# Patient Record
Sex: FEMALE | Race: WHITE | HISPANIC OR LATINO | Employment: STUDENT | URBAN - METROPOLITAN AREA
[De-identification: names, ages, dates, MRNs, and addresses within clinical notes are randomized per-mention and may not be internally consistent; named-entity substitution may affect disease eponyms.]

---

## 2018-05-05 ENCOUNTER — HOSPITAL ENCOUNTER (EMERGENCY)
Facility: MEDICAL CENTER | Age: 38
End: 2018-05-05
Attending: EMERGENCY MEDICINE

## 2018-05-05 VITALS
RESPIRATION RATE: 17 BRPM | HEART RATE: 100 BPM | SYSTOLIC BLOOD PRESSURE: 119 MMHG | HEIGHT: 63 IN | BODY MASS INDEX: 41.64 KG/M2 | DIASTOLIC BLOOD PRESSURE: 60 MMHG | TEMPERATURE: 97.9 F | OXYGEN SATURATION: 98 % | WEIGHT: 235 LBS

## 2018-05-05 DIAGNOSIS — H61.21 IMPACTED CERUMEN OF RIGHT EAR: ICD-10-CM

## 2018-05-05 PROCEDURE — 69210 REMOVE IMPACTED EAR WAX UNI: CPT

## 2018-05-05 PROCEDURE — 99283 EMERGENCY DEPT VISIT LOW MDM: CPT

## 2018-05-05 NOTE — ED PROVIDER NOTES
"ED Provider Note    ER PROVIDER NOTE      CHIEF COMPLAINT  Chief Complaint   Patient presents with   • Ear Pain       HPI  Evelyn Brandt is a 37 y.o. female who presents to the emergency department complaining of right-sided ear pain she reports ear pain has been worse over the last few days.  She has no fever, no congestion, no headaches.  No change in hearing or drainage    REVIEW OF SYSTEMS  Pertinent positives include ear pain. Pertinent negatives include no fever. See HPI for details. All other systems reviewed and are negative.    PAST MEDICAL HISTORY   Patient denies any past medical history including diabetes    SOCIAL HISTORY  Social History   Substance Use Topics   • Smoking status: Not on file   • Smokeless tobacco: Not on file   • Alcohol use Not on file   Denies drug or alcohol use    SURGICAL HISTORY  patient denies any surgical history    CURRENT MEDICATIONS  Home Medications    **Home medications have not yet been reviewed for this encounter**     No home medications    ALLERGIES  No Known Allergies    PHYSICAL EXAM  VITAL SIGNS: /60   Pulse 100   Temp 36.6 °C (97.9 °F)   Resp 17   Ht 1.6 m (5' 3\")   Wt 106.6 kg (235 lb)   SpO2 98%   BMI 41.63 kg/m²   Pulse ox interpretation: I interpret this pulse ox as normal.    Constitutional: Alert.  In no apparent distress.  HENT: Normocephalic, Atraumatic, Bilateral external ears normal. Nose normal.   Eyes: Pupils are equal and reactive. Conjunctiva normal, non-icteric.   Ears: Impacted cerumen on right, left canal is clear, mastoids nontender  Cerumen was removed with curette TM visualized without effusion or findings suggestive of perforation or infection    Heart: Regular rate and rhythm, no murmurs.    Lungs: Clear to auscultation bilaterally.  Skin: Warm, Dry, No erythema, No rash.   Musculoskeletal: No tenderness or major deformities noted. No edema.  Neurologic: Alert, Grossly non-focal.   Psychiatric: Affect normal, Judgment normal, " Mood normal, Appears appropriate and not intoxicated.     DIAGNOSTIC STUDIES / PROCEDURES        COURSE & MEDICAL DECISION MAKING  Nursing notes, VS, PMSFHx reviewed in chart.    4:54 PM - Patient seen and examined at bedside.  Cerumen removed as above  Decision Making:  This is a 37 y.o. female presenting with ear pain found to have cerumen impaction.  There is no evidence of concomitant otitis media or otitis externa or TM perforation.  Advised that she will need to continue eardrops as an outpatient for continued removal of the wax     The patient will return for new or worsening symptoms and is stable at the time of discharge.    The patient is referred to a primary physician for blood pressure management, diabetic screening, and for all other preventative health concerns.        DISPOSITION:  Patient will be discharged home in stable condition.    FOLLOW UP:  [address] [id number] [phone]    As needed      OUTPATIENT MEDICATIONS:  New Prescriptions    No medications on file         FINAL IMPRESSION  1. Impacted cerumen of right ear        The note accurately reflects work and decisions made by me.  Mike Mcdermott  5/5/2018  5:08 PM

## 2018-05-06 NOTE — DISCHARGE INSTRUCTIONS
There is still a small amount of earwax in your ear that will take some time to remove with the appropriate over-the-counter eardrops such as Debrox which you can buy at the pharmacy.  Please do not put any objects in her ear including headphones    Cerumen Plug  A cerumen plug is having too much wax in your ear canal. The outer ear canal is lined with hairs and glands that secrete wax. This wax is called cerumen. This protects the ear canal. It also helps prevent material from entering the ear. Too much wax can cause a feeling of fullness in the ears, decreased hearing, ringing in the ears, or an earache. Sometimes your caregiver will remove a cerumen plug with an instrument called a curette. Or he/she may flush the ear canal with warm water from a syringe to remove the wax. You may simply be sent home to follow the home care instructions below for wax removal.  Generally ear wax does not have to be removed unless it is causing a problem such as one of those listed above. When too much wax is causing a problem, the following are a few home remedies which can be used to help this problem.  HOME CARE INSTRUCTIONS   · Put a couple drops of glycerin, baby oil, or mineral oil in the ear a couple times of day. Do this every day for several days. After putting the drops in, you will need to lay with the affected ear pointing up for a couple minutes. This allows the drops to remain in the canal and run down to the area of wax blockage. This will soften the wax plug. It may also make your hearing worse as the wax softens and blocks the canal even more.  · After a couple days, you may gently flush the ear canal with warm water from a syringe. Do this by pulling your ear up and back with your head tilted slightly forward and towards a pan to catch the water. This is most easily done with a helper. You can also accomplish the same thing by letting the shower beat into your ear canal to wash the wax out. Sometimes this will not  be immediately successful. You will have to return to the first step of using the oil to further soften the wax. Then resume washing the ear canal out with a syringe or shower.  · Following removal of the wax, put ten to twenty drops of rubbing alcohol into the outer ears. This will dry the canal and prevent an infection.  · Do not irrigate or wash out your ears if you have had a perforated ear drum or mastoid surgery.  SEEK IMMEDIATE MEDICAL CARE IF:   · You are unsuccessful with the above instructions for home care.  · You develop ear pain or drainage from the ear.  MAKE SURE YOU:   · Understand these instructions.  · Will watch your condition.  · Will get help right away if you are not doing well or get worse.  Document Released: 09/12/2002 Document Revised: 03/11/2013 Document Reviewed: 12/09/2009  [employer]® Patient Information ©2014 [employer].

## 2019-09-25 ENCOUNTER — APPOINTMENT (OUTPATIENT)
Dept: RADIOLOGY | Facility: MEDICAL CENTER | Age: 39
End: 2019-09-25
Attending: EMERGENCY MEDICINE
Payer: COMMERCIAL

## 2019-09-25 ENCOUNTER — PATIENT OUTREACH (OUTPATIENT)
Dept: HEALTH INFORMATION MANAGEMENT | Facility: OTHER | Age: 39
End: 2019-09-25

## 2019-09-25 ENCOUNTER — HOSPITAL ENCOUNTER (EMERGENCY)
Facility: MEDICAL CENTER | Age: 39
End: 2019-09-25
Attending: EMERGENCY MEDICINE
Payer: COMMERCIAL

## 2019-09-25 VITALS
WEIGHT: 293 LBS | HEIGHT: 63 IN | TEMPERATURE: 98.1 F | SYSTOLIC BLOOD PRESSURE: 116 MMHG | BODY MASS INDEX: 51.91 KG/M2 | DIASTOLIC BLOOD PRESSURE: 79 MMHG | OXYGEN SATURATION: 98 % | HEART RATE: 73 BPM | RESPIRATION RATE: 20 BRPM

## 2019-09-25 DIAGNOSIS — S20.211A CHEST WALL CONTUSION, RIGHT, INITIAL ENCOUNTER: ICD-10-CM

## 2019-09-25 DIAGNOSIS — S10.91XA ABRASION OF NECK, INITIAL ENCOUNTER: ICD-10-CM
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
## 2019-09-25 LAB
ABO GROUP BLD: NORMAL
ALBUMIN SERPL BCP-MCNC: 4.1 G/DL (ref 3.2–4.9)
ALBUMIN/GLOB SERPL: 1.4 G/DL
ALP SERPL-CCNC: 90 U/L (ref 30–99)
ALT SERPL-CCNC: 49 U/L (ref 2–50)
ANION GAP SERPL CALC-SCNC: 7 MMOL/L (ref 0–11.9)
APTT PPP: 28.9 SEC (ref 24.7–36)
AST SERPL-CCNC: 28 U/L (ref 12–45)
BILIRUB SERPL-MCNC: 0.3 MG/DL (ref 0.1–1.5)
BLD GP AB SCN SERPL QL: NORMAL
BUN SERPL-MCNC: 10 MG/DL (ref 8–22)
CALCIUM SERPL-MCNC: 9.6 MG/DL (ref 8.5–10.5)
CHLORIDE SERPL-SCNC: 107 MMOL/L (ref 96–112)
CO2 SERPL-SCNC: 23 MMOL/L (ref 20–33)
CREAT SERPL-MCNC: 0.75 MG/DL (ref 0.5–1.4)
ERYTHROCYTE [DISTWIDTH] IN BLOOD BY AUTOMATED COUNT: 45.2 FL (ref 35.9–50)
ETHANOL BLD-MCNC: 0.01 G/DL
GLOBULIN SER CALC-MCNC: 2.9 G/DL (ref 1.9–3.5)
GLUCOSE SERPL-MCNC: 115 MG/DL (ref 65–99)
HCG SERPL QL: NEGATIVE
HCT VFR BLD AUTO: 36.9 % (ref 37–47)
HGB BLD-MCNC: 10.5 G/DL (ref 12–16)
INR PPP: 1.01 (ref 0.87–1.13)
MCH RBC QN AUTO: 22.4 PG (ref 27–33)
MCHC RBC AUTO-ENTMCNC: 28.5 G/DL (ref 33.6–35)
MCV RBC AUTO: 78.7 FL (ref 81.4–97.8)
PLATELET # BLD AUTO: 352 K/UL (ref 164–446)
PMV BLD AUTO: 11 FL (ref 9–12.9)
POTASSIUM SERPL-SCNC: 3.8 MMOL/L (ref 3.6–5.5)
PROT SERPL-MCNC: 7 G/DL (ref 6–8.2)
PROTHROMBIN TIME: 13.6 SEC (ref 12–14.6)
RBC # BLD AUTO: 4.69 M/UL (ref 4.2–5.4)
RH BLD: NORMAL
SODIUM SERPL-SCNC: 137 MMOL/L (ref 135–145)
WBC # BLD AUTO: 11.3 K/UL (ref 4.8–10.8)

## 2019-09-25 PROCEDURE — 85027 COMPLETE CBC AUTOMATED: CPT

## 2019-09-25 PROCEDURE — 86901 BLOOD TYPING SEROLOGIC RH(D): CPT

## 2019-09-25 PROCEDURE — 74177 CT ABD & PELVIS W/CONTRAST: CPT

## 2019-09-25 PROCEDURE — 80307 DRUG TEST PRSMV CHEM ANLYZR: CPT

## 2019-09-25 PROCEDURE — 700117 HCHG RX CONTRAST REV CODE 255: Performed by: EMERGENCY MEDICINE

## 2019-09-25 PROCEDURE — 85730 THROMBOPLASTIN TIME PARTIAL: CPT

## 2019-09-25 PROCEDURE — 305948 HCHG GREEN TRAUMA ACT PRE-NOTIFY NO CC

## 2019-09-25 PROCEDURE — 99284 EMERGENCY DEPT VISIT MOD MDM: CPT

## 2019-09-25 PROCEDURE — 84703 CHORIONIC GONADOTROPIN ASSAY: CPT

## 2019-09-25 PROCEDURE — 85610 PROTHROMBIN TIME: CPT

## 2019-09-25 PROCEDURE — 80053 COMPREHEN METABOLIC PANEL: CPT

## 2019-09-25 PROCEDURE — 86900 BLOOD TYPING SEROLOGIC ABO: CPT

## 2019-09-25 PROCEDURE — 86850 RBC ANTIBODY SCREEN: CPT

## 2019-09-25 RX ADMIN — IOHEXOL 100 ML: 350 INJECTION, SOLUTION INTRAVENOUS at 14:47

## 2019-09-25 ASSESSMENT — LIFESTYLE VARIABLES
DO YOU DRINK ALCOHOL: NO
DOES PATIENT WANT TO STOP DRINKING: NO

## 2019-09-25 NOTE — ED NOTES
Received orders for DC. PIV removed and dressing applied. VSS. Educated regarding establishing care with [facility] for f/u imaging regarding abnormal images. Questions answered regarding how to follow up. Verbalizes an understanding of discharge teaching. Ambulatory with a steady gait with family to B-16.
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
## 2019-09-25 NOTE — DISCHARGE INSTRUCTIONS
Follow-up with [facility] regarding your liver and uterus, both of which appeared abnormal on the CAT scan we did here today.    Take Motrin 600 mg as needed for pain    Return to the ER for difficulty breathing, vomiting, or other concerns
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
## 2020-04-29 ENCOUNTER — TELEPHONE (OUTPATIENT)
Dept: OBGYN | Facility: CLINIC | Age: 40
End: 2020-04-29

## 2020-10-22 ENCOUNTER — HOSPITAL ENCOUNTER (OUTPATIENT)
Dept: LAB | Facility: MEDICAL CENTER | Age: 40
End: 2020-10-22
Payer: COMMERCIAL

## 2020-10-23 LAB
COVID ORDER STATUS COVID19: NORMAL
SARS-COV-2 RNA RESP QL NAA+PROBE: NOTDETECTED
SPECIMEN SOURCE: NORMAL

## 2021-03-25 ENCOUNTER — TELEPHONE (OUTPATIENT)
Dept: OBGYN | Facility: CLINIC | Age: 41
End: 2021-03-25

## 2021-06-14 ENCOUNTER — HOSPITAL ENCOUNTER (OUTPATIENT)
Facility: MEDICAL CENTER | Age: 41
End: 2021-06-14
Attending: OBSTETRICS & GYNECOLOGY
Payer: COMMERCIAL

## 2021-06-14 ENCOUNTER — GYNECOLOGY VISIT (OUTPATIENT)
Dept: OBGYN | Facility: CLINIC | Age: 41
End: 2021-06-14
Payer: COMMERCIAL

## 2021-06-14 VITALS — BODY MASS INDEX: 41.88 KG/M2 | SYSTOLIC BLOOD PRESSURE: 108 MMHG | DIASTOLIC BLOOD PRESSURE: 68 MMHG | WEIGHT: 236.4 LBS

## 2021-06-14 DIAGNOSIS — N93.9 ABNORMAL UTERINE BLEEDING (AUB): ICD-10-CM

## 2021-06-14 DIAGNOSIS — D25.0 FIBROIDS, SUBMUCOSAL: ICD-10-CM

## 2021-06-14 PROCEDURE — 87624 HPV HI-RISK TYP POOLED RSLT: CPT

## 2021-06-14 PROCEDURE — 99204 OFFICE O/P NEW MOD 45 MIN: CPT | Performed by: OBSTETRICS & GYNECOLOGY

## 2021-06-14 PROCEDURE — 88175 CYTOPATH C/V AUTO FLUID REDO: CPT

## 2021-06-14 ASSESSMENT — FIBROSIS 4 INDEX: FIB4 SCORE: 0.44

## 2021-06-14 NOTE — NON-PROVIDER
Pt here as new patient, here to establish care experiencing   Pt states pain during intercourse and pelvic pain. Pt currently trying to conceive   Good# [phone]  Pharmacy verified

## 2021-06-14 NOTE — PROGRESS NOTES
40 y.o.     Female presents as new patient for evaluation of pelvic pain, dyspareunia   Patient is unsure of menstrual cycles , as she doesnt have menstrual calendar   Subjective:Pain:  Pain:  Nature: constant pain with sex only . , Intensity: moderate, Location: lumbar, Radiation: Non-radiating  diarrhea :  No          Vaginal discharge: no discharge   Vaginal Bleeding: none  Dysmenorrhea:positive, Dyspareunia:positive - with missionary only   Problems with contraception: negative     LMP:  No LMP recorded.  ROS:  Neurological:Denies, headaches  Breast:{Denies breast tenderness, mass, discharge, changes in size or contour, or abnormal cyclic discomfort., Negative breast symptoms: tenderness, pain, mass, cyst  GastroIntestinalNegative for abdominal discomfort, blood in stools or black stools  Genito-urinary:{Negative for dysuria, frequency and incontinence  Menstrual: DENIES, bleeding between periods  All other systems reviewed : and are Negative  PMH:  History reviewed. No pertinent past medical history.  History reviewed. No pertinent surgical history.  None  History reviewed. No pertinent family history.  Social History     Socioeconomic History   • Marital status: Single     Spouse name: Not on file   • Number of children: Not on file   • Years of education: Not on file   • Highest education level: Not on file   Occupational History   • Not on file   Tobacco Use   • Smoking status: Never Smoker   • Smokeless tobacco: Never Used   Vaping Use   • Vaping Use: Never used   Substance and Sexual Activity   • Alcohol use: Never   • Drug use: Not Currently   • Sexual activity: Yes     Partners: Male     Birth control/protection: None   Other Topics Concern   • Not on file   Social History Narrative   • Not on file     Social Determinants of Health     Financial Resource Strain:    • Difficulty of Paying Living Expenses:    Food Insecurity:    • Worried About Running Out of Food in the Last Year:    • Ran Out of  Food in the Last Year:    Transportation Needs:    • Lack of Transportation (Medical):    • Lack of Transportation (Non-Medical):    Physical Activity:    • Days of Exercise per Week:    • Minutes of Exercise per Session:    Stress:    • Feeling of Stress :    Social Connections:    • Frequency of Communication with Friends and Family:    • Frequency of Social Gatherings with Friends and Family:    • Attends Denominational Services:    • Active Member of Clubs or Organizations:    • Attends Club or Organization Meetings:    • Marital Status:    Intimate Partner Violence:    • Fear of Current or Ex-Partner:    • Emotionally Abused:    • Physically Abused:    • Sexually Abused:        No current outpatient medications on file prior to visit.     No current facility-administered medications on file prior to visit.       Summary of Allergies:  Patient has no known allergies.  /68 (BP Location: Left arm, Patient Position: Sitting, BP Cuff Size: Adult)   Wt 107 kg (236 lb 6.4 oz)   BMI 41.88 kg/m²   Exam:    Skin: Warm and dry with no lesions or rashes.  Lymph: no inguinal nodes  Neuro: Awake, alert and oriented x 3  ENT:Normal, Unremarkable  Eyes: corneas clear and conjunctivae and sclerae normal  BREAST: negative  Abdominal :   positive findings: obese, soft , non tende r, no masses  Genito-Urinary:Perineum and external genitalia normal cervical os : open withe aborting 3 cm , multiloculated mass , on stalk , unable to corkscrew deliver , small amount ofg blood , only , uterus , unable to palpate additional fibroids   Extremities:no cyanosis, clubbing or edema present    Psych: normal affect  LAB:  Lab:No results found for this or any previous visit (from the past 336 hour(s)).       Ass:  pelvic pain, fibroids, dyspareunia   Actively Aborting submucosal fibroid   Patient denies active bleeding , pain , thus this aborting myoma , not emergent   Spent 45 minutes minutes with the patient ; Face to Face, with >50% of  this time spent in counseling and coordination of care, surrounding the above mentioned issues as well as:   P.  Pelvic U/s , labs , luteal   May need to submit for  Outpatient removal

## 2021-06-15 DIAGNOSIS — D25.0 FIBROIDS, SUBMUCOSAL: ICD-10-CM

## 2021-06-15 LAB
CYTOLOGY REG CYTOL: NORMAL
HPV HR 12 DNA CVX QL NAA+PROBE: NEGATIVE
HPV16 DNA SPEC QL NAA+PROBE: NEGATIVE
HPV18 DNA SPEC QL NAA+PROBE: NEGATIVE
SPECIMEN SOURCE: NORMAL

## 2021-06-18 ENCOUNTER — TELEPHONE (OUTPATIENT)
Dept: OBGYN | Facility: CLINIC | Age: 41
End: 2021-06-18

## 2021-06-18 NOTE — TELEPHONE ENCOUNTER
----- Message from Danny Harper M.D. sent at 6/18/2021  6:49 AM PDT -----  Cytology : scan , but HPV negative   Need to repeat       6/18/2021 1053 Tried calling pt on home and mobil number. Home phone# call was rejected and mobil number is not a working number. FYI placed in chart pt has f/u appt scheduled for 7/6/2021 with Dr. Harper.   6/22/2021 1137 called but phone number is a not working number.  6/25/2021 1024  called but phone number is a not working number. Pt does not have mychart.  7/6/2021 pt came in for her appt today and per Genesis Harper's MA pap was repeated.

## 2021-07-06 ENCOUNTER — HOSPITAL ENCOUNTER (OUTPATIENT)
Facility: MEDICAL CENTER | Age: 41
End: 2021-07-06
Attending: OBSTETRICS & GYNECOLOGY

## 2021-07-06 ENCOUNTER — GYNECOLOGY VISIT (OUTPATIENT)
Dept: OBGYN | Facility: CLINIC | Age: 41
End: 2021-07-06
Payer: COMMERCIAL

## 2021-07-06 VITALS — DIASTOLIC BLOOD PRESSURE: 64 MMHG | BODY MASS INDEX: 41.45 KG/M2 | WEIGHT: 234 LBS | SYSTOLIC BLOOD PRESSURE: 125 MMHG

## 2021-07-06 DIAGNOSIS — D25.0 SUBMUCOUS MYOMA OF UTERUS: ICD-10-CM

## 2021-07-06 DIAGNOSIS — D25.0 FIBROIDS, SUBMUCOSAL: ICD-10-CM

## 2021-07-06 DIAGNOSIS — N93.9 ABNORMAL UTERINE BLEEDING (AUB): ICD-10-CM

## 2021-07-06 LAB
AMBIGUOUS DTTM AMBI4: NORMAL
PATHOLOGY CONSULT NOTE: NORMAL

## 2021-07-06 PROCEDURE — 88175 CYTOPATH C/V AUTO FLUID REDO: CPT

## 2021-07-06 PROCEDURE — 99214 OFFICE O/P EST MOD 30 MIN: CPT | Mod: 57 | Performed by: OBSTETRICS & GYNECOLOGY

## 2021-07-06 PROCEDURE — 58145 MYOMECTOMY VAG METHOD: CPT | Performed by: OBSTETRICS & GYNECOLOGY

## 2021-07-06 PROCEDURE — 88305 TISSUE EXAM BY PATHOLOGIST: CPT

## 2021-07-06 PROCEDURE — 87624 HPV HI-RISK TYP POOLED RSLT: CPT

## 2021-07-06 ASSESSMENT — FIBROSIS 4 INDEX: FIB4 SCORE: 0.44

## 2021-07-06 NOTE — PROGRESS NOTES
40 y.o.  female previously seen for : Chief Complaint:  abnormal uterine bleeding, pelvic pain, fibroids    Specialty Problems     None      . Patient now here in follow up. Was seen  , with obese abdomen , and large aborting myoma . Was to have U/s and some lab work . Patient did not do either .   Patient here to with relative , who explains , patient has poor memory , and thus did not remember /understand about prior tests .   Patient had U/S requested, but records from [facility], infers this was done , but no copy sent . !!!    Patient's last menstrual period was 2021 (approximate).      Subjective: Abdominal Pain: positive    Vaginal Bleedingpositive  Menstrual Cycle: normal: negative  Dysmenorrhea:positive:   Dyspareunia:negative  Urinary Symptoms: frequency    Vaginal Discharge:{No        No current outpatient medications on file.  ROS: no change in ROS since visit of : 2021.  :No results found for this or any previous visit (from the past 336 hour(s)).    Vitals:    21 1033   BP: 125/64   BP Location: Left arm   Patient Position: Sitting   BP Cuff Size: Large adult   Weight: 106 kg (234 lb)     History reviewed. No pertinent past medical history.  PGYN: None  Social History     Socioeconomic History   • Marital status: Single     Spouse name: Not on file   • Number of children: Not on file   • Years of education: Not on file   • Highest education level: Not on file   Occupational History   • Not on file   Tobacco Use   • Smoking status: Never Smoker   • Smokeless tobacco: Never Used   Vaping Use   • Vaping Use: Never used   Substance and Sexual Activity   • Alcohol use: Never   • Drug use: Not Currently   • Sexual activity: Yes     Partners: Male     Birth control/protection: None   Other Topics Concern   • Not on file   Social History Narrative   • Not on file     Social Determinants of Health     Financial Resource Strain:    • Difficulty of Paying Living Expenses:    Food Insecurity:     • Worried About Running Out of Food in the Last Year:    • Ran Out of Food in the Last Year:    Transportation Needs:    • Lack of Transportation (Medical):    • Lack of Transportation (Non-Medical):    Physical Activity:    • Days of Exercise per Week:    • Minutes of Exercise per Session:    Stress:    • Feeling of Stress :    Social Connections:    • Frequency of Communication with Friends and Family:    • Frequency of Social Gatherings with Friends and Family:    • Attends Taoism Services:    • Active Member of Clubs or Organizations:    • Attends Club or Organization Meetings:    • Marital Status:    Intimate Partner Violence:    • Fear of Current or Ex-Partner:    • Emotionally Abused:    • Physically Abused:    • Sexually Abused:      History reviewed. No pertinent family history.  History reviewed. No pertinent surgical history.      Exam:   General : Awake, alert and oriented x 3, Cranial nerves II-XII grossly intact, Reflexes symmetrical  Abdominal : no masses, nontender, obese, non-distended no rebound or guarding  Pelvic :  external genitalia normal, Bartholin's glands, urethra, [place]'s glands negative;  Blood in vault , still aborting myoma 4-5 cm , : Today , able to grasp with rings, and corkscrew delivery effected of entire myoma .....   Pelvic Support system : normal      Ass: uterine fibroids           Aborting myoma          AUB , menorrhagia     Spent 30 minutes minutes with the patient ; Face to Face, with >50% of this time spent in counseling and coordination of care, surrounding the above mentioned issues as well as:Explain importance of having scan , as unable to fully assess uterus , with obesity   P. Myomectomy : done       Check CBC, Progesterone       Still need formal scan   Follow up : Return visit in 4 week(s)

## 2021-07-06 NOTE — PROCEDURES
Procedures    Myomectomy :   After betadine prep   Myoma 5 cm at external os , grasped with initially Alligator clamp, then ring , and cork screwed  180 ..... 360 degrees , with tightening of stalk, then separation of stalk from base , cervix dilated to 6 cm   Base  Hemostatic .   Specimen to path   PAtient tolerated well

## 2021-07-06 NOTE — NON-PROVIDER
Pt here for Fv Cytology scan and repeat pap per    Pt states no questions or concerns   Good#[phone]   Mother in law's phone number who assists Evelyn in daily things. [phone]  Pharmacy verified

## 2021-07-06 NOTE — LETTER
July 6, 2021    To Whom It May Concern:         This is confirmation that Evelyn Brandt attended her scheduled appointment with Danny Harper M.D. on 7/06/21.         If you have any questions please do not hesitate to call me at the phone number listed below.    Sincerely,             [phone]

## 2021-07-16 ENCOUNTER — HOSPITAL ENCOUNTER (OUTPATIENT)
Dept: LAB | Facility: MEDICAL CENTER | Age: 41
End: 2021-07-16
Attending: OBSTETRICS & GYNECOLOGY
Payer: COMMERCIAL

## 2021-07-16 DIAGNOSIS — D25.0 FIBROIDS, SUBMUCOSAL: ICD-10-CM

## 2021-07-16 DIAGNOSIS — N93.9 ABNORMAL UTERINE BLEEDING (AUB): ICD-10-CM

## 2021-07-16 LAB
DHEA-S SERPL-MCNC: 81.7 UG/DL (ref 60.9–337)
ERYTHROCYTE [DISTWIDTH] IN BLOOD BY AUTOMATED COUNT: 50.2 FL (ref 35.9–50)
FSH SERPL-ACNC: 12.7 MIU/ML
HCT VFR BLD AUTO: 26.5 % (ref 37–47)
HGB BLD-MCNC: 6.4 G/DL (ref 12–16)
LH SERPL-ACNC: 39.9 IU/L
MCH RBC QN AUTO: 16.3 PG (ref 27–33)
MCHC RBC AUTO-ENTMCNC: 24.2 G/DL (ref 33.6–35)
MCV RBC AUTO: 67.6 FL (ref 81.4–97.8)
PLATELET # BLD AUTO: 369 K/UL (ref 164–446)
PMV BLD AUTO: 10.4 FL (ref 9–12.9)
PROGEST SERPL-MCNC: 0.24 NG/ML
PROLACTIN SERPL-MCNC: 22 NG/ML (ref 2.8–26)
RBC # BLD AUTO: 3.92 M/UL (ref 4.2–5.4)
T4 FREE SERPL-MCNC: 1.08 NG/DL (ref 0.93–1.7)
TESTOST SERPL-MCNC: 10 NG/DL (ref 9–75)
TSH SERPL DL<=0.005 MIU/L-ACNC: 3.47 UIU/ML (ref 0.38–5.33)
WBC # BLD AUTO: 8.3 K/UL (ref 4.8–10.8)

## 2021-07-16 PROCEDURE — 83001 ASSAY OF GONADOTROPIN (FSH): CPT

## 2021-07-16 PROCEDURE — 84443 ASSAY THYROID STIM HORMONE: CPT

## 2021-07-16 PROCEDURE — 84146 ASSAY OF PROLACTIN: CPT

## 2021-07-16 PROCEDURE — 84144 ASSAY OF PROGESTERONE: CPT

## 2021-07-16 PROCEDURE — 84403 ASSAY OF TOTAL TESTOSTERONE: CPT

## 2021-07-16 PROCEDURE — 84439 ASSAY OF FREE THYROXINE: CPT

## 2021-07-16 PROCEDURE — 36415 COLL VENOUS BLD VENIPUNCTURE: CPT

## 2021-07-16 PROCEDURE — 85027 COMPLETE CBC AUTOMATED: CPT

## 2021-07-16 PROCEDURE — 82627 DEHYDROEPIANDROSTERONE: CPT

## 2021-07-16 PROCEDURE — 83002 ASSAY OF GONADOTROPIN (LH): CPT

## 2021-08-27 ENCOUNTER — GYNECOLOGY VISIT (OUTPATIENT)
Dept: OBGYN | Facility: CLINIC | Age: 41
End: 2021-08-27
Payer: COMMERCIAL

## 2021-08-27 VITALS — SYSTOLIC BLOOD PRESSURE: 138 MMHG | BODY MASS INDEX: 42.16 KG/M2 | WEIGHT: 238 LBS | DIASTOLIC BLOOD PRESSURE: 72 MMHG

## 2021-08-27 DIAGNOSIS — N93.9 ABNORMAL UTERINE BLEEDING (AUB): ICD-10-CM

## 2021-08-27 DIAGNOSIS — D50.0 IRON DEFICIENCY ANEMIA DUE TO CHRONIC BLOOD LOSS: ICD-10-CM

## 2021-08-27 DIAGNOSIS — D25.9 UTERINE LEIOMYOMA, UNSPECIFIED LOCATION: ICD-10-CM

## 2021-08-27 DIAGNOSIS — N94.10 DYSPAREUNIA, FEMALE: ICD-10-CM

## 2021-08-27 DIAGNOSIS — F79 MENTALLY CHALLENGED: ICD-10-CM

## 2021-08-27 PROCEDURE — 99214 OFFICE O/P EST MOD 30 MIN: CPT | Mod: 24 | Performed by: OBSTETRICS & GYNECOLOGY

## 2021-08-27 ASSESSMENT — FIBROSIS 4 INDEX: FIB4 SCORE: 0.42
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
## 2021-08-27 NOTE — Clinical Note
This patient was ayush.  They are following up with me on 11/16.  I was suppose to get na diagnostic US report for her.  I do not see it in her chart.  Can you please get the record before she comes to her appt?

## 2021-08-30 PROBLEM — N94.10 DYSPAREUNIA, FEMALE: Status: ACTIVE | Noted: 2021-08-30

## 2021-08-30 PROBLEM — F79 MENTALLY CHALLENGED: Status: ACTIVE | Noted: 2021-08-30

## 2021-08-30 PROBLEM — N93.9 ABNORMAL UTERINE BLEEDING (AUB): Status: ACTIVE | Noted: 2021-08-30

## 2021-08-30 PROBLEM — D25.9 UTERINE LEIOMYOMA: Status: ACTIVE | Noted: 2021-07-06

## 2021-08-30 PROBLEM — D50.0 IRON DEFICIENCY ANEMIA DUE TO CHRONIC BLOOD LOSS: Status: ACTIVE | Noted: 2021-08-30

## 2021-08-30 NOTE — PROGRESS NOTES
"Chief Complaint   Patient presents with   • Gynecologic Exam       History of present illness: 40 y.o.  No LMP recorded. presents with f/u.  Patient of Dr. Harper was being worked up for AUB, pelvic pain and dyspareunia.  Has heavy bleeding, but unable to describe how much.  Mother in law present providing much of history due to patients cognitive challenges.  Mother in law describes her as \"mentally challenged\".      Had office procedure to remove prolapsing fibroid by Dr. Harper.  Unable to describe how bleeding has been since then.      Not sure if wants to have a child.  Is also unsure if she could raise a child either.  MIL present does not think she could do it.      Reports having an ultrasound done at [email or web address] in March, no results to review in office.      Review of systems:  Pertinent positives documented in HPI and all other systems reviewed & are negative    Past OB History:   OB History    Para Term  AB Living   0 0 0 0 0 0   SAB TAB Ectopic Molar Multiple Live Births   0 0 0 0 0 0       Past Gynecological History  No LMP recorded.  See previous    All PMH, PSH, allergies, social history and FH reviewed and updated today:  Past Medical History:   Diagnosis Date   • Iron deficiency anemia due to chronic blood loss 2021       History reviewed. No pertinent surgical history.    Allergies: No Known Allergies    Social History     Socioeconomic History   • Marital status: Single     Spouse name: Not on file   • Number of children: Not on file   • Years of education: Not on file   • Highest education level: Not on file   Occupational History   • Not on file   Tobacco Use   • Smoking status: Never Smoker   • Smokeless tobacco: Never Used   Vaping Use   • Vaping Use: Never used   Substance and Sexual Activity   • Alcohol use: Never   • Drug use: Not Currently   • Sexual activity: Yes     Partners: Male     Birth control/protection: None   Other Topics Concern   • Not on file "   Social History Narrative   • Not on file     Social Determinants of Health     Financial Resource Strain:    • Difficulty of Paying Living Expenses:    Food Insecurity:    • Worried About Running Out of Food in the Last Year:    • Ran Out of Food in the Last Year:    Transportation Needs:    • Lack of Transportation (Medical):    • Lack of Transportation (Non-Medical):    Physical Activity:    • Days of Exercise per Week:    • Minutes of Exercise per Session:    Stress:    • Feeling of Stress :    Social Connections:    • Frequency of Communication with Friends and Family:    • Frequency of Social Gatherings with Friends and Family:    • Attends Restorationism Services:    • Active Member of Clubs or Organizations:    • Attends Club or Organization Meetings:    • Marital Status:    Intimate Partner Violence:    • Fear of Current or Ex-Partner:    • Emotionally Abused:    • Physically Abused:    • Sexually Abused:        History reviewed. No pertinent family history.    Physical exam:  /72   Wt 108 kg (238 lb)     General:appears stated age, is in no apparent distress, is well developed and well nourished  Skin: No rashes, or ulcers or lesions seen  Psychiatric: Patient shows appropriate affect, is alert and oriented x3, intact judgment and insight.    Lab Results   Component Value Date/Time    WBC 8.3 2021 11:11 AM    RBC 3.92 (L) 2021 11:11 AM    HEMOGLOBIN 6.4 (L) 2021 11:11 AM    HEMATOCRIT 26.5 (L) 2021 11:11 AM    MCV 67.6 (L) 2021 11:11 AM    MCH 16.3 (L) 2021 11:11 AM    MCHC 24.2 (L) 2021 11:11 AM    MPV 10.4 2021 11:11 AM        Assessment  40 y.o.  here for:   1. Abnormal uterine bleeding (AUB)     2. Uterine leiomyoma, unspecified location     3. Dyspareunia, female     4. Mentally challenged     5. Iron deficiency anemia due to chronic blood loss         Plan  - Severe anemia, however history is unable to be collected to explain how it is  occurring.  If it is not from her vaginal bleeding, she needs an immediate work up for another medical disease.   - We need to obtain records of patient's ultrasound done at outside institution for review ([facility])  - Need to perform physical exam at next visit to explore pelvic pain and dyspareunia   - Pt needs to decide if she wants a future pregnancy.  Management greatly depends on this.  Mental disability is a very important factor when considering having a child, but it is not ethical to use this factor as a reason to impose sterility upon a woman.  I do think her severe anemia and fibroid uterus need to be addressed and am concerned about her ability to have children medically with her age and fibroid uterus as a factor.  She may require surgical intervention to help her anemia.    -pt and family to discuss fertility as a family and return to discuss further  - Follow up for further evaluation

## 2021-11-08 ENCOUNTER — TELEPHONE (OUTPATIENT)
Dept: OBGYN | Facility: CLINIC | Age: 41
End: 2021-11-08

## 2021-11-08 NOTE — TELEPHONE ENCOUNTER
11/08/21 8:53 AM    LM on vm of [facility], have requested U/S report from March x 3 and still have not received. Faxed another HIM request to get report from [facility] before pt appt.

## 2021-11-08 NOTE — TELEPHONE ENCOUNTER
[facility] Risa called stating that our office has request U/S imaging of patient Evelyn. Risa states that they only have one report on file of patient and that they will fax that over right now to fax number provided ([phone]).    No other questions asked

## 2021-11-16 ENCOUNTER — GYNECOLOGY VISIT (OUTPATIENT)
Dept: OBGYN | Facility: CLINIC | Age: 41
End: 2021-11-16
Payer: COMMERCIAL

## 2021-11-16 VITALS — DIASTOLIC BLOOD PRESSURE: 74 MMHG | SYSTOLIC BLOOD PRESSURE: 134 MMHG | WEIGHT: 223 LBS | BODY MASS INDEX: 39.5 KG/M2

## 2021-11-16 DIAGNOSIS — F79 MENTALLY CHALLENGED: ICD-10-CM

## 2021-11-16 DIAGNOSIS — D50.0 IRON DEFICIENCY ANEMIA DUE TO CHRONIC BLOOD LOSS: ICD-10-CM

## 2021-11-16 DIAGNOSIS — N93.9 ABNORMAL UTERINE BLEEDING (AUB): ICD-10-CM

## 2021-11-16 PROCEDURE — 99214 OFFICE O/P EST MOD 30 MIN: CPT | Performed by: OBSTETRICS & GYNECOLOGY

## 2021-11-16 RX ORDER — DROSPIRENONE AND ETHINYL ESTRADIOL 0.02-3(28)
1 KIT ORAL DAILY
Qty: 84 TABLET | Refills: 3 | Status: SHIPPED | OUTPATIENT
Start: 2021-11-16

## 2021-11-16 NOTE — PROGRESS NOTES
Chief Complaint   Patient presents with   • Gynecologic Exam       History of present illness: 40 y.o.  No LMP recorded. presents with f/u for results from US review    Review of systems:  Pertinent positives documented in HPI and all other systems reviewed & are negative    Past OB History:   OB History    Para Term  AB Living   0 0 0 0 0 0   SAB IAB Ectopic Molar Multiple Live Births   0 0 0 0 0 0       Past Gynecological History  No LMP recorded.  BC or HRT: denies  Menarche: 12  Menses: normal until age 31 then heavier and more irregular  Sexually active: yes,  Some dyspareunia with back pain   Sexually transmitted infections: denies  Pap: last , denies hx of abnormal  Symptoms of overactive bladder: denies  Symptoms of stress incontinence: denies  Symptoms of bowel incontinence: denies  Feeling of vaginal bulge: denies  Mammogram: last denies  History of sexual abuse: denies  Fibroids?: YES  Ovarian cysts?: denies    All PMH, PSH, allergies, social history and FH reviewed and updated today:  Past Medical History:   Diagnosis Date   • Iron deficiency anemia due to chronic blood loss 2021       History reviewed. No pertinent surgical history.    Allergies: No Known Allergies    Social History     Socioeconomic History   • Marital status: Single     Spouse name: Not on file   • Number of children: Not on file   • Years of education: Not on file   • Highest education level: Not on file   Occupational History   • Not on file   Tobacco Use   • Smoking status: Never Smoker   • Smokeless tobacco: Never Used   Vaping Use   • Vaping Use: Never used   Substance and Sexual Activity   • Alcohol use: Never   • Drug use: Not Currently   • Sexual activity: Yes     Partners: Male     Birth control/protection: None   Other Topics Concern   • Not on file   Social History Narrative   • Not on file     Social Determinants of Health     Financial Resource Strain:    • Difficulty of Paying Living  Expenses: Not on file   Food Insecurity:    • Worried About Running Out of Food in the Last Year: Not on file   • Ran Out of Food in the Last Year: Not on file   Transportation Needs:    • Lack of Transportation (Medical): Not on file   • Lack of Transportation (Non-Medical): Not on file   Physical Activity:    • Days of Exercise per Week: Not on file   • Minutes of Exercise per Session: Not on file   Stress:    • Feeling of Stress : Not on file   Social Connections:    • Frequency of Communication with Friends and Family: Not on file   • Frequency of Social Gatherings with Friends and Family: Not on file   • Attends Gnosticist Services: Not on file   • Active Member of Clubs or Organizations: Not on file   • Attends Club or Organization Meetings: Not on file   • Marital Status: Not on file   Intimate Partner Violence:    • Fear of Current or Ex-Partner: Not on file   • Emotionally Abused: Not on file   • Physically Abused: Not on file   • Sexually Abused: Not on file   Housing Stability:    • Unable to Pay for Housing in the Last Year: Not on file   • Number of Places Lived in the Last Year: Not on file   • Unstable Housing in the Last Year: Not on file       History reviewed. No pertinent family history.    Physical exam:  /74   Wt 101 kg (223 lb)     General:appears stated age, is in no apparent distress, is well developed and well nourished  Skin: No rashes, or ulcers or lesions seen  Psychiatric: Patient shows appropriate affect, is alert and oriented x3, intact judgment and insight.      Assessment  40 y.o.  here for:   1. Abnormal uterine bleeding (AUB)  CBC WITHOUT DIFFERENTIAL   2. Iron deficiency anemia due to chronic blood loss     3. Mentally challenged         Plan  - recommend birth control to help with bleeding  - discussed a pregnancy and due to patient's mental challenges, does not appear pt or MIL in room feel comfortable with her getting pregnant.  Discussed the challenges of being  pregnant at age 41 and the risks for baby and herself.  They agreed to start birth control   - Discussed Us with fibroids, no other concerns  - bleeding profile is better since office myomectomy, will recheck hemoglobin to make sure not still severely anemic  - Follow up in 3 months after starting birth control

## 2021-11-16 NOTE — PROGRESS NOTES
Pt here to discuss U/S result from [facility]  Pt states no complaints  Good#[phone]  Pharmacy verified